# Patient Record
(demographics unavailable — no encounter records)

---

## 2024-10-08 NOTE — HISTORY OF PRESENT ILLNESS
[de-identified] : Isha is a 20y/o female originally referred for pre surgical clearance. As a part of pre-surgical clearance Isha had labs done on 5/7/24 which revealed the following:  WBC 17.6K/ul (H), ANC 8380/ul (H), Lymphocytes absolute 4.95K/uL (H), Eosinophils absolute 3.77K/ul (H), Monocytes Absolute 0.18K/ul, Absolute basophils 0.33K/ul (H)  IgE 631kU/L (H)  Isha has a significant atopic history including seasonal (tree pollen, birch), environmental (dog), and food allergies (pistachio, pecans, and cashews); and asthma that is exacerbated by her environmental allergies. She takes Breo daily and OTC Xyzal. She uses Albuterol PRN for asthma exacerbation. The family has two dogs.   Isha has also had recent imaging done as a part of pre-surgical clearance. This includes an abdominal US (revealing hepatic steatosis and hepatomegaly); UGI Xray Double Contrast study (revealing "tiny sliding hiatal hernia"); and a stress echocardiogram (showed "normal exercise stress echocardiogram").  Isha has no history of eczema. She has no skin rashes or abnormal GI symptoms.   At initial visit on 5/20/24 - Eosinophil # 5.51. Had a normal exercise induced echocardiogram on 3/20/24. Started on Prednisone 40mg BID.    Sent additional bloodwork at this visit, including normal FISH - PDGFRA; BCR-ABL1 fusion transcripts (p210 and p190) both not detected; IL5P <1; Strongyloides and Toxcocara Ab negative; Troponin <.01; CPK 170U/L  Sent peripheral flow cytometry on 8/16/24 - showed "no aberrant T-cells".   Have been monitoring Isha's eosinophil number and weaning Prednisone to keep Eosinophil # <1.5. Started Dupixent on 6/28/24.  [de-identified] : Currently taking prednisone 5mg every other day.

## 2024-10-08 NOTE — REASON FOR VISIT
[Follow-Up Visit] : a follow-up visit for [Patient] : patient [Medical Records] : medical records [[email or web address]] : Eosinophilia

## 2024-10-08 NOTE — HISTORY OF PRESENT ILLNESS
[de-identified] : Isha is a 20y/o female originally referred for pre surgical clearance. As a part of pre-surgical clearance Isha had labs done on 5/7/24 which revealed the following:  WBC 17.6K/ul (H), ANC 8380/ul (H), Lymphocytes absolute 4.95K/uL (H), Eosinophils absolute 3.77K/ul (H), Monocytes Absolute 0.18K/ul, Absolute basophils 0.33K/ul (H)  IgE 631kU/L (H)  Isha has a significant atopic history including seasonal (tree pollen, birch), environmental (dog), and food allergies (pistachio, pecans, and cashews); and asthma that is exacerbated by her environmental allergies. She takes Breo daily and OTC Xyzal. She uses Albuterol PRN for asthma exacerbation. The family has two dogs.   Isha has also had recent imaging done as a part of pre-surgical clearance. This includes an abdominal US (revealing hepatic steatosis and hepatomegaly); UGI Xray Double Contrast study (revealing "tiny sliding hiatal hernia"); and a stress echocardiogram (showed "normal exercise stress echocardiogram").  Isha has no history of eczema. She has no skin rashes or abnormal GI symptoms.   At initial visit on 5/20/24 - Eosinophil # 5.51. Had a normal exercise induced echocardiogram on 3/20/24. Started on Prednisone 40mg BID.    Sent additional bloodwork at this visit, including normal FISH - PDGFRA; BCR-ABL1 fusion transcripts (p210 and p190) both not detected; IL5P <1; Strongyloides and Toxcocara Ab negative; Troponin <.01; CPK 170U/L  Sent peripheral flow cytometry on 8/16/24 - showed "no aberrant T-cells".   Have been monitoring Isha's eosinophil number and weaning Prednisone to keep Eosinophil # <1.5. Started Dupixent on 6/28/24.  [de-identified] : Currently taking prednisone 5mg every other day.